# Patient Record
Sex: FEMALE | Race: WHITE | NOT HISPANIC OR LATINO | Employment: PART TIME | URBAN - METROPOLITAN AREA
[De-identification: names, ages, dates, MRNs, and addresses within clinical notes are randomized per-mention and may not be internally consistent; named-entity substitution may affect disease eponyms.]

---

## 2017-08-14 ENCOUNTER — HOSPITAL ENCOUNTER (OUTPATIENT)
Dept: OTHER | Facility: HOSPITAL | Age: 42
Discharge: HOME | End: 2017-08-14
Attending: FAMILY MEDICINE | Admitting: FAMILY MEDICINE

## 2017-08-28 ENCOUNTER — HOSPITAL ENCOUNTER (OUTPATIENT)
Dept: OTHER | Facility: HOSPITAL | Age: 42
Discharge: HOME | End: 2017-08-28
Attending: FAMILY MEDICINE | Admitting: FAMILY MEDICINE

## 2017-09-18 ENCOUNTER — HOSPITAL ENCOUNTER (OUTPATIENT)
Dept: MAMMOGRAPHY | Facility: HOSPITAL | Age: 42
Discharge: HOME | End: 2017-09-18
Attending: FAMILY MEDICINE | Admitting: FAMILY MEDICINE

## 2018-12-05 ENCOUNTER — HOSPITAL ENCOUNTER (OUTPATIENT)
Dept: OTHER | Facility: HOSPITAL | Age: 43
Discharge: HOME | End: 2018-12-05
Attending: FAMILY MEDICINE | Admitting: FAMILY MEDICINE

## 2019-07-08 ENCOUNTER — OFFICE VISIT (OUTPATIENT)
Dept: FAMILY MEDICINE CLINIC | Facility: CLINIC | Age: 44
End: 2019-07-08

## 2019-07-08 VITALS
SYSTOLIC BLOOD PRESSURE: 130 MMHG | TEMPERATURE: 98.6 F | BODY MASS INDEX: 21.97 KG/M2 | HEART RATE: 65 BPM | HEIGHT: 63 IN | DIASTOLIC BLOOD PRESSURE: 70 MMHG | WEIGHT: 124 LBS | RESPIRATION RATE: 18 BRPM | OXYGEN SATURATION: 97 %

## 2019-07-08 DIAGNOSIS — H60.11 CELLULITIS OF RIGHT EAR: Primary | ICD-10-CM

## 2019-07-08 PROCEDURE — 99213 OFFICE O/P EST LOW 20 MIN: CPT | Performed by: FAMILY MEDICINE

## 2019-07-08 RX ORDER — NAPROXEN 500 MG/1
500 TABLET ORAL DAILY
COMMUNITY
End: 2022-12-01

## 2019-07-08 RX ORDER — FLUOXETINE HYDROCHLORIDE 20 MG/1
20 CAPSULE ORAL DAILY
COMMUNITY
End: 2019-11-20 | Stop reason: SDUPTHER

## 2019-07-08 RX ORDER — SULFAMETHOXAZOLE AND TRIMETHOPRIM 800; 160 MG/1; MG/1
1 TABLET ORAL 2 TIMES DAILY
Qty: 30 TABLET | Refills: 0 | Status: SHIPPED | OUTPATIENT
Start: 2019-07-08 | End: 2020-01-20

## 2019-07-08 NOTE — PROGRESS NOTES
Subjective   Morena Mcfadden is a 43 y.o. female.     Chief Complaint   Patient presents with   • Earache       Earache    There is pain in the right ear. This is a new problem. The current episode started in the past 7 days. The problem occurs constantly. The problem has been rapidly worsening. There has been no fever. The pain is at a severity of 7/10. The pain is moderate. Pertinent negatives include no abdominal pain or ear discharge. Associated symptoms comments: Morena's right ear lobe is swollen, red and very tender to the touch. She reports that she poke a needle in her ear to try and drain it on 2019 with no relief.. She has tried nothing for the symptoms.            I personally reviewed and updated the patient's allergies, medications, problem list, and past medical, surgical, social, and family history.      Family History   Problem Relation Age of Onset   • Diabetes Father    • Alcohol abuse Father    • Cancer Paternal Aunt         Unknown Type       Social History     Tobacco Use   • Smoking status: Current Every Day Smoker     Packs/day: 1.00     Years: 25.00     Pack years: 25.00     Types: Cigarettes     Start date:    • Smokeless tobacco: Never Used   Substance Use Topics   • Alcohol use: Yes     Alcohol/week: 0.6 - 1.2 oz     Types: 1 - 2 Cans of beer per week     Frequency: 4 or more times a week     Drinks per session: 1 or 2     Binge frequency: Monthly   • Drug use: No       Past Surgical History:   Procedure Laterality Date   •  SECTION     •  SECTION     • RESECTION SMALL BOWEL / CLOSURE ILEOSTOMY  2013    ileal duodenal resection- mass benign   • TUBAL ABDOMINAL LIGATION  2012    [facility]       Patient Active Problem List   Diagnosis   • Cellulitis of right ear         Current Outpatient Medications:   •  FLUoxetine (PROzac) 20 MG capsule, Take 20 mg by mouth Daily., Disp: , Rfl:   •  Multiple Vitamins-Minerals (MULTIVITAMIN ADULT  "PO), Take 1 tablet by mouth Daily., Disp: , Rfl:   •  naproxen (NAPROSYN) 500 MG tablet, Take 500 mg by mouth Daily., Disp: , Rfl:   •  sulfamethoxazole-trimethoprim (BACTRIM DS) 800-160 MG per tablet, Take 1 tablet by mouth 2 (Two) Times a Day., Disp: 30 tablet, Rfl: 0         Review of Systems   Constitutional: Negative for chills and diaphoresis.   HENT: Positive for ear pain. Negative for ear discharge.    Eyes: Negative for visual disturbance.   Respiratory: Negative for shortness of breath.    Cardiovascular: Negative for chest pain and palpitations.   Gastrointestinal: Negative for abdominal pain and nausea.   Endocrine: Negative for polydipsia and polyphagia.   Musculoskeletal: Negative for neck stiffness.   Skin: Negative for color change and pallor.   Neurological: Negative for seizures and syncope.   Hematological: Negative for adenopathy.       Objective   /70 (BP Location: Right arm, Patient Position: Sitting, Cuff Size: Adult)   Pulse 65   Temp 98.6 °F (37 °C) (Oral)   Resp 18   Ht 160 cm (63\")   Wt 56.2 kg (124 lb)   LMP 06/10/2019 (Exact Date)   SpO2 97% Comment: Room air  Breastfeeding? No   BMI 21.97 kg/m²   Physical Exam   Constitutional: She is oriented to person, place, and time. She appears well-developed and well-nourished.   HENT:   Head: Normocephalic.   Right Ear: Hearing, external ear and ear canal normal. Tympanic membrane is erythematous. A middle ear effusion is present.   Left Ear: Hearing, external ear and ear canal normal. Tympanic membrane is erythematous. A middle ear effusion is present.   Nose: Congestion present. Right sinus exhibits maxillary sinus tenderness and frontal sinus tenderness. Left sinus exhibits maxillary sinus tenderness and frontal sinus tenderness.   Mouth/Throat: Posterior oropharyngeal erythema present. No tonsillar abscesses. Tonsils are 1+ on the right. Tonsils are 1+ on the left.   Cellulitis r pinna with swelling, no induration or fluctuance "    Eyes: Conjunctivae, EOM and lids are normal. Pupils are equal, round, and reactive to light.   Neck: No Brudzinski's sign and no Kernig's sign noted.   Cardiovascular: Normal rate, regular rhythm, S1 normal, S2 normal, normal heart sounds and normal pulses. Exam reveals no gallop and no friction rub.   No murmur heard.  Pulmonary/Chest: Effort normal. No accessory muscle usage or stridor. No respiratory distress. She has no decreased breath sounds. She has wheezes in the right upper field, the right middle field, the right lower field, the left upper field, the left middle field and the left lower field. She has rhonchi in the right upper field, the right middle field, the right lower field, the left upper field, the left middle field and the left lower field. She has no rales.   Abdominal: Soft. Normal appearance and bowel sounds are normal. She exhibits no distension and no mass. There is no tenderness. No hernia.   Neurological: She is alert and oriented to person, place, and time. No cranial nerve deficit. Coordination and gait normal.   Skin: Skin is warm and dry. Turgor is normal. She is not diaphoretic. No pallor.         Assessment/Plan   Problems Addressed this Visit        Nervous and Auditory    Cellulitis of right ear - Primary    Relevant Orders    Ambulatory Referral to ENT (Otolaryngology) (Completed)        Problem List Items Addressed This Visit        Nervous and Auditory    Cellulitis of right ear - Primary    Overview     Start antibiotics  Discuss possible necessity of incision and drainage.  heent ref sched  Call or return if worsening or persistent symptoms.          Relevant Orders    Ambulatory Referral to ENT (Otolaryngology) (Completed)            Expected course, medications, and adverse effects discussed.  Call or return if worsening or persistent symptoms.

## 2019-11-22 RX ORDER — FLUOXETINE HYDROCHLORIDE 20 MG/1
CAPSULE ORAL
Qty: 30 CAPSULE | Refills: 0 | Status: SHIPPED | OUTPATIENT
Start: 2019-11-22 | End: 2019-12-15 | Stop reason: SDUPTHER

## 2019-12-17 RX ORDER — FLUOXETINE HYDROCHLORIDE 20 MG/1
CAPSULE ORAL
Qty: 10 CAPSULE | Refills: 0 | Status: SHIPPED | OUTPATIENT
Start: 2019-12-17 | End: 2020-01-20 | Stop reason: SDUPTHER

## 2020-01-20 ENCOUNTER — OFFICE VISIT (OUTPATIENT)
Dept: FAMILY MEDICINE CLINIC | Facility: CLINIC | Age: 45
End: 2020-01-20

## 2020-01-20 VITALS
HEART RATE: 100 BPM | RESPIRATION RATE: 16 BRPM | WEIGHT: 129.4 LBS | TEMPERATURE: 98.6 F | DIASTOLIC BLOOD PRESSURE: 72 MMHG | OXYGEN SATURATION: 98 % | HEIGHT: 63 IN | BODY MASS INDEX: 22.93 KG/M2 | SYSTOLIC BLOOD PRESSURE: 112 MMHG

## 2020-01-20 DIAGNOSIS — F41.9 ANXIETY: ICD-10-CM

## 2020-01-20 DIAGNOSIS — F33.1 MODERATE EPISODE OF RECURRENT MAJOR DEPRESSIVE DISORDER (HCC): Primary | ICD-10-CM

## 2020-01-20 PROBLEM — R92.30 DENSE BREAST: Status: ACTIVE | Noted: 2020-01-20

## 2020-01-20 PROBLEM — F43.23 ACUTE ADJUSTMENT DISORDER WITH MIXED ANXIETY AND DEPRESSED MOOD: Status: ACTIVE | Noted: 2020-01-20

## 2020-01-20 PROBLEM — M25.50 ARTHRALGIA OF MULTIPLE SITES: Status: ACTIVE | Noted: 2020-01-20

## 2020-01-20 PROBLEM — R92.2 DENSE BREAST: Status: ACTIVE | Noted: 2020-01-20

## 2020-01-20 PROBLEM — E78.5 DYSLIPIDEMIA: Status: ACTIVE | Noted: 2020-01-20

## 2020-01-20 PROBLEM — F43.23 ACUTE ADJUSTMENT DISORDER WITH MIXED ANXIETY AND DEPRESSED MOOD: Status: RESOLVED | Noted: 2020-01-20 | Resolved: 2020-01-20

## 2020-01-20 PROCEDURE — 99213 OFFICE O/P EST LOW 20 MIN: CPT | Performed by: FAMILY MEDICINE

## 2020-01-20 RX ORDER — FLUOXETINE HYDROCHLORIDE 20 MG/1
20 CAPSULE ORAL DAILY
Qty: 30 CAPSULE | Refills: 12 | Status: SHIPPED | OUTPATIENT
Start: 2020-01-20 | End: 2021-03-18 | Stop reason: SDUPTHER

## 2020-01-20 NOTE — PROGRESS NOTES
Subjective   Morena Mcfadden is a 44 y.o. female.     Chief Complaint   Patient presents with   • Depression       Depression   Visit Type: follow-up  Patient presents with the following symptoms: depressed mood, excessive worry, fatigue and irritability.  Patient is not experiencing: decreased concentration, insomnia, nervousness/anxiety, restlessness and shortness of breath.  Frequency of symptoms: constantly   Severity: mild   Sleep per night: 7 hours  Sleep quality: fair  Nighttime awakenings: several       The following portions of the patient's history were reviewed and updated as appropriate: allergies, current medications, past family history, past medical history, past social history, past surgical history and problem list.    Allergies:  No Known Allergies    Social History:  Social History     Socioeconomic History   • Marital status:      Spouse name: Not on file   • Number of children: Not on file   • Years of education: Not on file   • Highest education level: Not on file   Tobacco Use   • Smoking status: Current Every Day Smoker     Packs/day: 1.00     Years: 25.00     Pack years: 25.00     Types: Cigarettes     Start date: 1993   • Smokeless tobacco: Never Used   Substance and Sexual Activity   • Alcohol use: Yes     Alcohol/week: 1.0 - 2.0 standard drinks     Types: 1 - 2 Cans of beer per week     Frequency: 4 or more times a week     Drinks per session: 1 or 2     Binge frequency: Monthly   • Drug use: No       Family History:  Family History   Problem Relation Age of Onset   • Diabetes Father    • Alcohol abuse Father    • Cancer Paternal Aunt         Unknown Type       Past Medical History :  Patient Active Problem List   Diagnosis   • Cellulitis of right ear   • Anxiety   • Arthralgia of multiple sites   • Dense breast   • Dyslipidemia   • Moderate episode of recurrent major depressive disorder (CMS/HCC)       Medication List:    Current Outpatient Medications:   •  FLUoxetine (PROzac)  "20 MG capsule, Take 1 capsule by mouth Daily., Disp: 30 capsule, Rfl: 12  •  Multiple Vitamins-Minerals (MULTIVITAMIN ADULT PO), Take 1 tablet by mouth Daily., Disp: , Rfl:   •  naproxen (NAPROSYN) 500 MG tablet, Take 500 mg by mouth Daily., Disp: , Rfl:     Past Surgical History:  Past Surgical History:   Procedure Laterality Date   •  SECTION     •  SECTION     • RESECTION SMALL BOWEL / CLOSURE ILEOSTOMY  2013    ileal duodenal resection- mass benign   • TUBAL ABDOMINAL LIGATION  2012    [facility]       Review of Systems:  Review of Systems   Constitutional: Positive for irritability. Negative for activity change and fever.   HENT: Negative for ear pain, rhinorrhea, sinus pressure and voice change.    Eyes: Negative for visual disturbance.   Respiratory: Negative for cough and shortness of breath.    Cardiovascular: Negative for chest pain.   Gastrointestinal: Negative for abdominal pain, diarrhea, nausea and vomiting.   Endocrine: Negative for cold intolerance and heat intolerance.   Genitourinary: Negative for frequency and urgency.   Musculoskeletal: Negative for arthralgias.   Skin: Negative for rash.   Neurological: Negative for syncope.   Hematological: Does not bruise/bleed easily.   Psychiatric/Behavioral: Positive for depressed mood. Negative for decreased concentration. The patient is not nervous/anxious and does not have insomnia.        I have reviewed and confirmed the accuracy of the ROS as documented by the MA/LPN/RN Maryellen Azar MD    Vital Signs:  Visit Vitals  /72   Pulse 100   Temp 98.6 °F (37 °C)   Resp 16   Ht 160.7 cm (63.25\")   Wt 58.7 kg (129 lb 6.4 oz)   LMP 12/15/2019 (Approximate)   SpO2 98%   BMI 22.74 kg/m²       Physical Exam   Constitutional: She is oriented to person, place, and time. She appears well-developed and well-nourished. She is cooperative.   Cardiovascular: Normal rate, regular rhythm and normal heart sounds. Exam " reveals no gallop and no friction rub.   No murmur heard.  Pulmonary/Chest: Effort normal and breath sounds normal. She has no wheezes. She has no rales.   Neurological: She is alert and oriented to person, place, and time. Coordination normal.   Skin: Skin is warm and dry.   Psychiatric: She has a normal mood and affect.   Vitals reviewed.      Assessment and Plan:  Problem List Items Addressed This Visit        Other    Anxiety    Relevant Medications    FLUoxetine (PROzac) 20 MG capsule    Moderate episode of recurrent major depressive disorder (CMS/HCC) - Primary    Overview     Refill prozac  Good social support, no suicidal or homicidal ideation. Diagnosis and treatment discussed. Discussed counseling. Discussed medication, dosing and adverse effects. Return in 4 weeks         Relevant Medications    FLUoxetine (PROzac) 20 MG capsule          An After Visit Summary and PPPS were given to the patient.

## 2020-02-10 ENCOUNTER — OFFICE VISIT (OUTPATIENT)
Dept: FAMILY MEDICINE CLINIC | Facility: CLINIC | Age: 45
End: 2020-02-10

## 2020-02-10 VITALS
WEIGHT: 129 LBS | SYSTOLIC BLOOD PRESSURE: 102 MMHG | TEMPERATURE: 98.4 F | OXYGEN SATURATION: 98 % | DIASTOLIC BLOOD PRESSURE: 66 MMHG | RESPIRATION RATE: 18 BRPM | HEIGHT: 64 IN | HEART RATE: 86 BPM | BODY MASS INDEX: 22.02 KG/M2

## 2020-02-10 DIAGNOSIS — Z12.31 SCREENING MAMMOGRAM, ENCOUNTER FOR: Chronic | ICD-10-CM

## 2020-02-10 DIAGNOSIS — Z12.4 SCREENING FOR CERVICAL CANCER: Chronic | ICD-10-CM

## 2020-02-10 DIAGNOSIS — Z11.3 SCREEN FOR STD (SEXUALLY TRANSMITTED DISEASE): ICD-10-CM

## 2020-02-10 DIAGNOSIS — F41.9 ANXIETY: ICD-10-CM

## 2020-02-10 DIAGNOSIS — F33.1 MODERATE EPISODE OF RECURRENT MAJOR DEPRESSIVE DISORDER (HCC): Primary | ICD-10-CM

## 2020-02-10 DIAGNOSIS — E78.5 DYSLIPIDEMIA: ICD-10-CM

## 2020-02-10 DIAGNOSIS — Z00.01 ENCOUNTER FOR GENERAL ADULT MEDICAL EXAMINATION WITH ABNORMAL FINDINGS: ICD-10-CM

## 2020-02-10 PROBLEM — H60.11 CELLULITIS OF RIGHT EAR: Status: RESOLVED | Noted: 2019-07-08 | Resolved: 2020-02-10

## 2020-02-10 LAB
BILIRUB BLD-MCNC: NEGATIVE MG/DL
CLARITY, POC: CLEAR
COLOR UR: YELLOW
GLUCOSE UR STRIP-MCNC: NEGATIVE MG/DL
KETONES UR QL: NEGATIVE
LEUKOCYTE EST, POC: NEGATIVE
NITRITE UR-MCNC: NEGATIVE MG/ML
PH UR: 5 [PH] (ref 5–8)
PROT UR STRIP-MCNC: ABNORMAL MG/DL
RBC # UR STRIP: NEGATIVE /UL
SP GR UR: 1.01 (ref 1–1.03)
UROBILINOGEN UR QL: NORMAL

## 2020-02-10 PROCEDURE — 99212 OFFICE O/P EST SF 10 MIN: CPT | Performed by: FAMILY MEDICINE

## 2020-02-10 PROCEDURE — 99396 PREV VISIT EST AGE 40-64: CPT | Performed by: FAMILY MEDICINE

## 2020-02-10 PROCEDURE — 81003 URINALYSIS AUTO W/O SCOPE: CPT | Performed by: FAMILY MEDICINE

## 2020-02-10 NOTE — PROGRESS NOTES
"  Chief Complaint   Patient presents with   • Annual Exam   • Exposure to STD         Subjective   Morena Mcfadden is a 44 y.o. female here for a Well Woman Visit. Energy level is described as good and she is sleeping well. She sleeps 6 hours nightly. Last pap was 2017. Contraception: none. Patient exercises not at all. Nutrition is described as in general, a \"healthy\" diet  . Her   libido is normal. She reports that she does not perform monthly self breast exam.      Exposure to STD    This is a new problem. The current episode started today. The problem has been unchanged. Pertinent negatives include no abdominal pain, fever or urinary frequency. She has tried nothing for the symptoms.   Depression   Visit Type: follow-up  Patient is not experiencing: decreased concentration, depressed mood, excessive worry, feelings of hopelessness, feelings of worthlessness, insomnia, irritability, nervousness/anxiety, palpitations, shortness of breath, suicidal ideas, suicidal planning and thoughts of death.  Frequency of symptoms: occasionally   Severity: moderate   Sleep quality: good      Anxiety   Presents for follow-up visit. Patient reports no chest pain, decreased concentration, depressed mood, excessive worry, insomnia, irritability, nausea, nervous/anxious behavior, palpitations, shortness of breath or suicidal ideas. Symptoms occur occasionally. The severity of symptoms is moderate. The quality of sleep is good.     Her past medical history is significant for depression. Compliance with medications is %.    Prozac is helping for her anxiety and depression    I personally reviewed and updated the patient's allergies, medications, problem list, and past medical, surgical, social, and family history.     Allergies:  No Known Allergies    Social History:  Social History     Socioeconomic History   • Marital status:      Spouse name: Not on file   • Number of children: Not on file   • Years of education: Not " on file   • Highest education level: Not on file   Tobacco Use   • Smoking status: Current Every Day Smoker     Packs/day: 1.00     Years: 25.00     Pack years: 25.00     Types: Cigarettes     Start date:    • Smokeless tobacco: Never Used   Substance and Sexual Activity   • Alcohol use: Yes     Alcohol/week: 1.0 - 2.0 standard drinks     Types: 1 - 2 Cans of beer per week     Frequency: 4 or more times a week     Drinks per session: 1 or 2     Binge frequency: Monthly   • Drug use: No       Family History:  Family History   Problem Relation Age of Onset   • Diabetes Father    • Alcohol abuse Father    • Cancer Paternal Aunt         Unknown Type       Past Medical History :  Active Ambulatory Problems     Diagnosis Date Noted   • Anxiety 2020   • Arthralgia of multiple sites 2020   • Dense breast 2020   • Dyslipidemia 2020   • Moderate episode of recurrent major depressive disorder (CMS/HCC) 2020     Resolved Ambulatory Problems     Diagnosis Date Noted   • Cellulitis of right ear 2019   • Acute adjustment disorder with mixed anxiety and depressed mood 2020     Past Medical History:   Diagnosis Date   • Adjustment disorder with mixed anxiety and depressed mood    • Anxiety disorder        Medication List:    Current Outpatient Medications:   •  FLUoxetine (PROzac) 20 MG capsule, Take 1 capsule by mouth Daily., Disp: 30 capsule, Rfl: 12  •  Multiple Vitamins-Minerals (MULTIVITAMIN ADULT PO), Take 1 tablet by mouth Daily., Disp: , Rfl:   •  naproxen (NAPROSYN) 500 MG tablet, Take 500 mg by mouth Daily., Disp: , Rfl:     Past Surgical History:  Past Surgical History:   Procedure Laterality Date   •  SECTION     •  SECTION     • RESECTION SMALL BOWEL / CLOSURE ILEOSTOMY  2013    ileal duodenal resection- mass benign   • TUBAL ABDOMINAL LIGATION  2012    [facility]       Depression Screen:   PHQ-2/PHQ-9 Depression Screening 2019  "  Little interest or pleasure in doing things 0   Feeling down, depressed, or hopeless 0   Total Score 0       Fall Risk Screen:  SRAVANI Fall Risk Assessment has not been completed.    Review Of Systems:  Review of Systems   Constitutional: Negative for activity change, fever and irritability.   HENT: Negative for ear pain, rhinorrhea, sinus pressure and voice change.    Eyes: Negative for visual disturbance.   Respiratory: Negative for cough and shortness of breath.    Cardiovascular: Negative for chest pain and palpitations.   Gastrointestinal: Negative for abdominal pain, diarrhea, nausea and vomiting.   Endocrine: Negative for cold intolerance and heat intolerance.   Genitourinary: Negative for frequency and urgency.   Musculoskeletal: Negative for arthralgias.   Skin: Negative for rash.   Neurological: Negative for syncope.   Hematological: Does not bruise/bleed easily.   Psychiatric/Behavioral: Negative for decreased concentration, suicidal ideas and depressed mood. The patient is not nervous/anxious and does not have insomnia.        Physical Exam:  Vital Signs:  Visit Vitals  /66   Pulse 86   Temp 98.4 °F (36.9 °C)   Resp 18   Ht 161.3 cm (63.5\")   Wt 58.5 kg (129 lb)   LMP 01/27/2020 (Approximate)   SpO2 98%   BMI 22.49 kg/m²       Physical Exam   Constitutional: She is oriented to person, place, and time. She appears well-developed and well-nourished. No distress.   HENT:   Head: Normocephalic and atraumatic.   Right Ear: External ear normal.   Left Ear: External ear normal.   Nose: Nose normal.   Mouth/Throat: Oropharynx is clear and moist. No oropharyngeal exudate.   Eyes: Pupils are equal, round, and reactive to light. Conjunctivae and EOM are normal. Right eye exhibits no discharge. Left eye exhibits no discharge. No scleral icterus.   Neck: Normal range of motion. Neck supple. No tracheal deviation present. No thyromegaly present.   Cardiovascular: Normal rate, regular rhythm, normal heart " sounds and intact distal pulses. Exam reveals no gallop and no friction rub.   No murmur heard.  Pulmonary/Chest: Effort normal and breath sounds normal. No stridor. No respiratory distress. She has no wheezes. She has no rales. Right breast exhibits no inverted nipple, no mass, no nipple discharge, no skin change and no tenderness. Left breast exhibits no inverted nipple, no mass, no nipple discharge, no skin change and no tenderness.   Abdominal: Soft. Bowel sounds are normal. She exhibits no distension and no mass. There is no tenderness. There is no rebound and no guarding.   Genitourinary: Uterus normal. There is no rash, tenderness or lesion on the right labia. There is no rash, tenderness or lesion on the left labia. Cervix does not exhibit discharge or lesion. Right adnexum displays no mass and no tenderness. Left adnexum displays no mass and no tenderness. Vagina exhibits no lesion. No erythema or bleeding in the vagina. No vaginal discharge found.   Musculoskeletal: Normal range of motion. She exhibits no edema, tenderness or deformity.   Lymphadenopathy:     She has no cervical adenopathy.   Neurological: She is alert and oriented to person, place, and time. She has normal strength and normal reflexes. She displays no atrophy, no tremor and normal reflexes. No cranial nerve deficit or sensory deficit. She exhibits normal muscle tone. Coordination and gait normal.   Skin: Skin is warm and dry. Capillary refill takes less than 2 seconds. No rash noted. She is not diaphoretic. No erythema. No pallor.   Psychiatric: She has a normal mood and affect. Her behavior is normal. Judgment and thought content normal. Cognition and memory are normal. Cognition and memory are not impaired. She exhibits normal recent memory and normal remote memory.   Nursing note and vitals reviewed.        Assessment and Plan:  Problem List Items Addressed This Visit        Other    Anxiety    Current Assessment & Plan     Prozac is  helping         Dyslipidemia    Overview     check labs         Relevant Orders    Comprehensive Metabolic Panel (Completed)    Lipid Panel With / Chol / HDL Ratio (Completed)    Moderate episode of recurrent major depressive disorder (CMS/HCC) - Primary    Overview     prozac is helping, will continue  Good social support, no suicidal or homicidal ideation. Diagnosis and treatment discussed. Discussed counseling. Discussed medication, dosing and adverse effects.            Other Visit Diagnoses     Encounter for general adult medical examination with abnormal findings        Chart reviewed and updated    Relevant Orders    POC Urinalysis Dipstick, Automated (Completed)    CBC & Differential (Completed)    TSH (Completed)    Screen for STD (sexually transmitted disease)        New problem: Will get STD test and pap smear secondary to partner had cheated on her a few times. No cervical tenderness on exam, so no zpak or rocephin today    Relevant Orders    Hepatitis Panel, Acute (Completed)    Screening mammogram, encounter for  (Chronic)       Last mammo 12/05/2018.    Relevant Orders    Mammo Screening Digital Tomosynthesis Bilateral With CAD    Screening for cervical cancer  (Chronic)       last pap with HPV WNL 8/14/2017.    Relevant Orders    HPV, Low Volume Reflex    Liquid-based Pap Smear, Screening    Chlamydia trachomatis, Neisseria gonorrhoeae, PCR - ThinPrep Vial, Cervix (Completed)    Hepatitis Panel, Acute    HIV 1 RNA Qualitative (Completed)          An After Visit Summary and PPPS were given to the patient.       Discussed wearing sunscreen, seatbelts. Avoidance or caution with alcohol. Safe sex practices discussed. Discussed screening as appropriate for age.

## 2020-02-13 LAB
C TRACH RRNA SPEC QL NAA+PROBE: NEGATIVE
CYTOLOGIST CVX/VAG CYTO: NORMAL
CYTOLOGY CVX/VAG DOC CYTO: NORMAL
DX ICD CODE: NORMAL
HIV 1 & 2 AB SER-IMP: NORMAL
HPV I/H RISK 1 DNA CVX QL PROBE+SIG AMP: NEGATIVE
N GONORRHOEA RRNA SPEC QL NAA+PROBE: NEGATIVE
OTHER STN SPEC: NORMAL
STAT OF ADQ CVX/VAG CYTO-IMP: NORMAL

## 2020-02-14 LAB
ALBUMIN SERPL-MCNC: 4.3 G/DL (ref 3.8–4.8)
ALBUMIN/GLOB SERPL: 2 {RATIO} (ref 1.2–2.2)
ALP SERPL-CCNC: 73 IU/L (ref 39–117)
ALT SERPL-CCNC: 12 IU/L (ref 0–32)
AST SERPL-CCNC: 21 IU/L (ref 0–40)
BASOPHILS # BLD AUTO: 0.1 X10E3/UL (ref 0–0.2)
BASOPHILS NFR BLD AUTO: 1 %
BILIRUB SERPL-MCNC: 0.6 MG/DL (ref 0–1.2)
BUN SERPL-MCNC: 8 MG/DL (ref 6–24)
BUN/CREAT SERPL: 11 (ref 9–23)
CALCIUM SERPL-MCNC: 9.7 MG/DL (ref 8.7–10.2)
CHLORIDE SERPL-SCNC: 103 MMOL/L (ref 96–106)
CHOLEST SERPL-MCNC: 160 MG/DL (ref 100–199)
CHOLEST/HDLC SERPL: 2.1 RATIO (ref 0–4.4)
CO2 SERPL-SCNC: 26 MMOL/L (ref 20–29)
CREAT SERPL-MCNC: 0.7 MG/DL (ref 0.57–1)
EOSINOPHIL # BLD AUTO: 0.3 X10E3/UL (ref 0–0.4)
EOSINOPHIL NFR BLD AUTO: 4 %
ERYTHROCYTE [DISTWIDTH] IN BLOOD BY AUTOMATED COUNT: 12.8 % (ref 11.7–15.4)
GLOBULIN SER CALC-MCNC: 2.2 G/DL (ref 1.5–4.5)
GLUCOSE SERPL-MCNC: 88 MG/DL (ref 65–99)
HAV IGM SERPL QL IA: NEGATIVE
HBV CORE IGM SERPL QL IA: NEGATIVE
HBV SURFACE AG SERPL QL IA: NEGATIVE
HCT VFR BLD AUTO: 42.8 % (ref 34–46.6)
HCV AB S/CO SERPL IA: 0.2 S/CO RATIO (ref 0–0.9)
HDLC SERPL-MCNC: 76 MG/DL
HGB BLD-MCNC: 13.5 G/DL (ref 11.1–15.9)
HIV1 RNA SERPL QL NAA+PROBE: NEGATIVE
IMM GRANULOCYTES # BLD AUTO: 0 X10E3/UL (ref 0–0.1)
IMM GRANULOCYTES NFR BLD AUTO: 0 %
LDLC SERPL CALC-MCNC: 73 MG/DL (ref 0–99)
LYMPHOCYTES # BLD AUTO: 2.7 X10E3/UL (ref 0.7–3.1)
LYMPHOCYTES NFR BLD AUTO: 34 %
MCH RBC QN AUTO: 30.1 PG (ref 26.6–33)
MCHC RBC AUTO-ENTMCNC: 31.5 G/DL (ref 31.5–35.7)
MCV RBC AUTO: 96 FL (ref 79–97)
MONOCYTES # BLD AUTO: 0.6 X10E3/UL (ref 0.1–0.9)
MONOCYTES NFR BLD AUTO: 7 %
NEUTROPHILS # BLD AUTO: 4.3 X10E3/UL (ref 1.4–7)
NEUTROPHILS NFR BLD AUTO: 54 %
PLATELET # BLD AUTO: 339 X10E3/UL (ref 150–450)
POTASSIUM SERPL-SCNC: 4.5 MMOL/L (ref 3.5–5.2)
PROT SERPL-MCNC: 6.5 G/DL (ref 6–8.5)
RBC # BLD AUTO: 4.48 X10E6/UL (ref 3.77–5.28)
SODIUM SERPL-SCNC: 140 MMOL/L (ref 134–144)
TRIGL SERPL-MCNC: 54 MG/DL (ref 0–149)
TSH SERPL DL<=0.005 MIU/L-ACNC: 1.18 UIU/ML (ref 0.45–4.5)
VLDLC SERPL CALC-MCNC: 11 MG/DL (ref 5–40)
WBC # BLD AUTO: 7.9 X10E3/UL (ref 3.4–10.8)

## 2020-02-18 ENCOUNTER — TELEPHONE (OUTPATIENT)
Dept: FAMILY MEDICINE CLINIC | Facility: CLINIC | Age: 45
End: 2020-02-18

## 2020-02-18 NOTE — TELEPHONE ENCOUNTER
----- Message from Maryellen Azar MD sent at 2/14/2020  4:58 PM EST -----  Labs and pap came back negative

## 2020-03-09 ENCOUNTER — HOSPITAL ENCOUNTER (OUTPATIENT)
Dept: MAMMOGRAPHY | Facility: HOSPITAL | Age: 45
Discharge: HOME | End: 2020-03-09
Admitting: FAMILY MEDICINE

## 2020-03-09 DIAGNOSIS — Z12.31 SCREENING MAMMOGRAM, ENCOUNTER FOR: Chronic | ICD-10-CM

## 2020-03-09 PROCEDURE — 77063 BREAST TOMOSYNTHESIS BI: CPT

## 2020-03-09 PROCEDURE — 77067 SCR MAMMO BI INCL CAD: CPT

## 2020-05-01 ENCOUNTER — TELEPHONE (OUTPATIENT)
Dept: FAMILY MEDICINE CLINIC | Facility: CLINIC | Age: 45
End: 2020-05-01

## 2020-05-19 ENCOUNTER — OFFICE VISIT (OUTPATIENT)
Dept: FAMILY MEDICINE CLINIC | Facility: CLINIC | Age: 45
End: 2020-05-19

## 2020-05-19 ENCOUNTER — HOSPITAL ENCOUNTER (OUTPATIENT)
Dept: GENERAL RADIOLOGY | Facility: HOSPITAL | Age: 45
Discharge: HOME | End: 2020-05-19
Admitting: FAMILY MEDICINE

## 2020-05-19 VITALS
RESPIRATION RATE: 16 BRPM | BODY MASS INDEX: 21.89 KG/M2 | SYSTOLIC BLOOD PRESSURE: 110 MMHG | HEART RATE: 84 BPM | OXYGEN SATURATION: 96 % | HEIGHT: 64 IN | TEMPERATURE: 98.3 F | DIASTOLIC BLOOD PRESSURE: 68 MMHG | WEIGHT: 128.2 LBS

## 2020-05-19 DIAGNOSIS — M25.532 LEFT WRIST PAIN: Primary | ICD-10-CM

## 2020-05-19 PROCEDURE — 99213 OFFICE O/P EST LOW 20 MIN: CPT | Performed by: FAMILY MEDICINE

## 2020-05-19 PROCEDURE — 73110 X-RAY EXAM OF WRIST: CPT

## 2020-05-19 NOTE — PROGRESS NOTES
Subjective   Morena Mcfadden is a 44 y.o. female.     Chief Complaint   Patient presents with   • Arm Pain       Arm Pain    The incident occurred more than 1 week ago. There was no injury mechanism. The pain is present in the left elbow, left forearm, left fingers, left hand and left wrist. The quality of the pain is described as aching and burning. The pain radiates to the left hand and left arm. The pain is at a severity of 4/10 (pain increases with laying down and becomes greater). The pain is moderate. The pain has been fluctuating since the incident. Associated symptoms include numbness and tingling. Pertinent negatives include no chest pain. The symptoms are aggravated by movement, lifting and palpation. She has tried nothing for the symptoms. The treatment provided no relief.        The following portions of the patient's history were reviewed and updated as appropriate: allergies, current medications, past family history, past medical history, past social history, past surgical history and problem list.    Allergies:  No Known Allergies    Social History:  Social History     Socioeconomic History   • Marital status:      Spouse name: Not on file   • Number of children: Not on file   • Years of education: Not on file   • Highest education level: Not on file   Tobacco Use   • Smoking status: Current Every Day Smoker     Packs/day: 1.00     Years: 25.00     Pack years: 25.00     Types: Cigarettes     Start date: 1993   • Smokeless tobacco: Never Used   Substance and Sexual Activity   • Alcohol use: Yes     Alcohol/week: 1.0 - 2.0 standard drinks     Types: 1 - 2 Cans of beer per week     Frequency: 4 or more times a week     Drinks per session: 1 or 2     Binge frequency: Monthly   • Drug use: No       Family History:  Family History   Problem Relation Age of Onset   • Diabetes Father    • Alcohol abuse Father    • Cancer Paternal Aunt         Unknown Type       Past Medical History :  Patient Active  "Problem List   Diagnosis   • Anxiety   • Arthralgia of multiple sites   • Dense breast   • Dyslipidemia   • Moderate episode of recurrent major depressive disorder (CMS/HCC)   • Left wrist pain       Medication List:    Current Outpatient Medications:   •  FLUoxetine (PROzac) 20 MG capsule, Take 1 capsule by mouth Daily., Disp: 30 capsule, Rfl: 12  •  Multiple Vitamins-Minerals (MULTIVITAMIN ADULT PO), Take 1 tablet by mouth Daily., Disp: , Rfl:   •  naproxen (NAPROSYN) 500 MG tablet, Take 500 mg by mouth Daily., Disp: , Rfl:     Past Surgical History:  Past Surgical History:   Procedure Laterality Date   •  SECTION     •  SECTION     • RESECTION SMALL BOWEL / CLOSURE ILEOSTOMY  2013    ileal duodenal resection- mass benign   • TUBAL ABDOMINAL LIGATION  2012    [facility]       Review of Systems:  Review of Systems   Constitutional: Negative for activity change and fever.   HENT: Negative for ear pain, rhinorrhea, sinus pressure and voice change.    Eyes: Negative for visual disturbance.   Respiratory: Negative for cough and shortness of breath.    Cardiovascular: Negative for chest pain.   Gastrointestinal: Negative for abdominal pain, diarrhea, nausea and vomiting.   Endocrine: Negative for cold intolerance and heat intolerance.   Genitourinary: Negative for frequency and urgency.   Musculoskeletal: Negative for arthralgias.   Skin: Negative for rash.   Neurological: Positive for tingling and numbness. Negative for syncope.   Hematological: Does not bruise/bleed easily.   Psychiatric/Behavioral: Negative for depressed mood. The patient is not nervous/anxious.        Physical Exam:  Vital Signs:  Visit Vitals  /68 (BP Location: Right arm)   Pulse 84   Temp 98.3 °F (36.8 °C) (Oral)   Resp 16   Ht 161.3 cm (63.5\")   Wt 58.2 kg (128 lb 3.2 oz)   LMP 2020 (Exact Date)   SpO2 96%   BMI 22.35 kg/m²       Physical Exam   Constitutional: She is oriented to person, place, " and time. She appears well-developed and well-nourished. She is cooperative.   Cardiovascular: Normal rate, regular rhythm and normal heart sounds. Exam reveals no gallop and no friction rub.   No murmur heard.  Pulmonary/Chest: Effort normal and breath sounds normal. She has no wheezes. She has no rales.   Musculoskeletal:        Left wrist: She exhibits tenderness.   Tender with extension and flexion. Negative tinel, positive phalen.    Neurological: She is alert and oriented to person, place, and time. Coordination normal.   Skin: Skin is warm and dry.   Psychiatric: She has a normal mood and affect.   Vitals reviewed.      Assessment and Plan:  Problem List Items Addressed This Visit        Nervous and Auditory    Left wrist pain - Primary    Relevant Orders    XR Wrist 3+ View Left (Completed)    EMG & Nerve Conduction Test       Will get emg and xray. Possibly carpal tunnel syndrome    An After Visit Summary and PPPS were given to the patient.             I wore protective equipment throughout this patient encounter to include mask, gloves and eye protection. Hand hygiene was performed before donning protective equipment and after removal when leaving the room.

## 2020-05-21 DIAGNOSIS — M25.532 LEFT WRIST PAIN: Primary | ICD-10-CM

## 2020-05-26 ENCOUNTER — TELEPHONE (OUTPATIENT)
Dept: FAMILY MEDICINE CLINIC | Facility: CLINIC | Age: 45
End: 2020-05-26

## 2020-06-11 ENCOUNTER — TELEPHONE (OUTPATIENT)
Dept: FAMILY MEDICINE CLINIC | Facility: CLINIC | Age: 45
End: 2020-06-11

## 2020-06-23 ENCOUNTER — TELEPHONE (OUTPATIENT)
Dept: FAMILY MEDICINE CLINIC | Facility: CLINIC | Age: 45
End: 2020-06-23

## 2020-07-13 ENCOUNTER — APPOINTMENT (OUTPATIENT)
Dept: INFUSION THERAPY | Facility: HOSPITAL | Age: 45
End: 2020-07-13

## 2020-09-11 ENCOUNTER — HOSPITAL ENCOUNTER (OUTPATIENT)
Dept: INFUSION THERAPY | Facility: HOSPITAL | Age: 45
Discharge: HOME | End: 2020-09-11
Admitting: PSYCHIATRY & NEUROLOGY

## 2020-09-11 DIAGNOSIS — M25.532 LEFT WRIST PAIN: ICD-10-CM

## 2020-09-11 PROCEDURE — 95911 NRV CNDJ TEST 9-10 STUDIES: CPT | Performed by: PSYCHIATRY & NEUROLOGY

## 2020-09-11 PROCEDURE — 95886 MUSC TEST DONE W/N TEST COMP: CPT | Performed by: PSYCHIATRY & NEUROLOGY

## 2020-09-11 PROCEDURE — 95911 NRV CNDJ TEST 9-10 STUDIES: CPT

## 2020-09-11 NOTE — PROCEDURES
EMG and Nerve Conduction Studies    I.      Instrument used: Neuromax 1002  II.     Please see data sheets for tabular summary of NCS and details on methods, temperatures and lab standards.   III.    EMG muscles tested for upper extremity studies include the deltoid, biceps, triceps, pronator teres, extensor digitorum communis, first dorsal interosseous and abductor pollicis brevis.    IV.   EMG muscles tested for lower extremity studies include the vastus lateralis, tibialis anterior, peroneus longus, medial gastrocnemius and extensor digitorum brevis.    V.    Additional muscles tested as needed.  Paraspinal muscles tested as needed.   VI.   Please see data sheets for tabular summary of EMG findings.   VII. The complete report includes the data sheets.      Indication: Bilateral carpal tunnel syndrome  History: 45-year-old woman with left wrist and hand pain with numbness and tingling in the left hand.  She has similar symptoms on the right but not as severe.  The patient has worked as a  for many years.  She has been using wrist splints with some degree of improvement in her nocturnal awakenings with wrist/hand pain numbness and tingling.      Ht: 161.3 cm  Wt: Not reported  HbA1C: No results found for: HGBA1C  TSH:   Lab Results   Component Value Date    TSH 1.180 02/10/2020       Technical summary:  Nerve conduction studies were obtained in both upper extremities.  Skin temperatures were at least 32 °C measured on the palms after warming the hands.  Needle examination was obtained on selected muscles in both upper extremities.    Results:  1.  Severely prolonged median sensory latencies at 5.9 ms on the left and 6.2 ms on the right with low amplitudes of 6.7 µV on the left and 8.7 µV on the right.  2.  Normal ulnar sensory studies bilaterally.  3.  Normal left radial sensory study.  4.  Severely prolonged left median motor latency at 7.1 ms with normal conduction velocity.  The amplitude was mildly low  at 4.0 mV from wrist stimulation.  Moderately prolonged right median motor latency at 5.5 ms with normal conduction velocity and amplitudes.  5.  Normal ulnar motor studies bilaterally.  6.  Needle examination of selected muscles in both arms showed occasional positive sharp waves in the abductor pollicis brevis muscles bilaterally.  There was a mild increased number of large motor units with increased firing rates and reduced interference patterns bilaterally.  All other muscles tested in both arm showed normal insertional activities, motor units and recruitment patterns.  Cervical paraspinals showed no abnormality at C7 on either side.    Impression:  Abnormal study showing bilateral median neuropathies at the wrists, moderate on the right and severe on the left.  There was no evidence of an ulnar neuropathy or cervical radiculopathy on either side by this study.  Needle exam changes were consistent with the nerve conduction findings.  Study results were discussed with the patient.    Robert Gregory M.D.              Dictated utilizing Dragon dictation.

## 2020-10-13 ENCOUNTER — TELEPHONE (OUTPATIENT)
Dept: FAMILY MEDICINE CLINIC | Facility: CLINIC | Age: 45
End: 2020-10-13

## 2020-10-13 DIAGNOSIS — M25.532 LEFT WRIST PAIN: Primary | ICD-10-CM

## 2020-10-13 NOTE — TELEPHONE ENCOUNTER
Called and spoke with pt and give appointment information on November 2 , 2020 at 9:15 am to arrive at 9:00 am. Pt stated she understood.

## 2021-03-18 ENCOUNTER — TELEPHONE (OUTPATIENT)
Dept: FAMILY MEDICINE CLINIC | Facility: CLINIC | Age: 46
End: 2021-03-18

## 2021-03-18 DIAGNOSIS — F41.9 ANXIETY: ICD-10-CM

## 2021-03-18 RX ORDER — FLUOXETINE HYDROCHLORIDE 20 MG/1
20 CAPSULE ORAL DAILY
Qty: 30 CAPSULE | Refills: 0 | Status: CANCELLED | OUTPATIENT
Start: 2021-03-18

## 2021-03-18 RX ORDER — FLUOXETINE HYDROCHLORIDE 20 MG/1
20 CAPSULE ORAL DAILY
Qty: 30 CAPSULE | Refills: 1 | Status: SHIPPED | OUTPATIENT
Start: 2021-03-18 | End: 2021-04-08 | Stop reason: SDUPTHER

## 2021-03-18 NOTE — TELEPHONE ENCOUNTER
Caller: Morena Mcfadden    Relationship: Self    Best call back number: [phone]   Medication needed:   Requested Prescriptions     Pending Prescriptions Disp Refills   • FLUoxetine (PROzac) 20 MG capsule 30 capsule 12     Sig: Take 1 capsule by mouth Daily.       When do you need the refill by: ASAP     What additional details did the patient provide when requesting the medication: ASAP     Does the patient have less than a 3 day supply:  [x] Yes  [] No    What is the patient's preferred pharmacy: [id number] - [place], [place] [address] - [phone] [facility] [phone] FX

## 2021-04-08 ENCOUNTER — OFFICE VISIT (OUTPATIENT)
Dept: FAMILY MEDICINE CLINIC | Facility: CLINIC | Age: 46
End: 2021-04-08

## 2021-04-08 VITALS
SYSTOLIC BLOOD PRESSURE: 120 MMHG | HEIGHT: 64 IN | TEMPERATURE: 98 F | RESPIRATION RATE: 18 BRPM | BODY MASS INDEX: 22.71 KG/M2 | HEART RATE: 86 BPM | WEIGHT: 133 LBS | OXYGEN SATURATION: 98 % | DIASTOLIC BLOOD PRESSURE: 64 MMHG

## 2021-04-08 DIAGNOSIS — F41.9 ANXIETY: ICD-10-CM

## 2021-04-08 DIAGNOSIS — F33.1 MODERATE EPISODE OF RECURRENT MAJOR DEPRESSIVE DISORDER (HCC): ICD-10-CM

## 2021-04-08 DIAGNOSIS — Z00.01 ENCOUNTER FOR ROUTINE ADULT PHYSICAL EXAM WITH ABNORMAL FINDINGS: Primary | ICD-10-CM

## 2021-04-08 DIAGNOSIS — E01.0 THYROMEGALY: ICD-10-CM

## 2021-04-08 DIAGNOSIS — Z12.31 SCREENING MAMMOGRAM, ENCOUNTER FOR: ICD-10-CM

## 2021-04-08 DIAGNOSIS — E78.5 DYSLIPIDEMIA: ICD-10-CM

## 2021-04-08 PROBLEM — Z12.4 SCREENING FOR CERVICAL CANCER: Status: ACTIVE | Noted: 2021-04-08

## 2021-04-08 LAB
BILIRUB BLD-MCNC: NEGATIVE MG/DL
CLARITY, POC: CLEAR
COLOR UR: YELLOW
GLUCOSE UR STRIP-MCNC: NEGATIVE MG/DL
KETONES UR QL: NEGATIVE
LEUKOCYTE EST, POC: NEGATIVE
NITRITE UR-MCNC: NEGATIVE MG/ML
PH UR: 5 [PH] (ref 5–8)
PROT UR STRIP-MCNC: ABNORMAL MG/DL
RBC # UR STRIP: NEGATIVE /UL
SP GR UR: 1.03 (ref 1–1.03)
UROBILINOGEN UR QL: NORMAL

## 2021-04-08 PROCEDURE — 99213 OFFICE O/P EST LOW 20 MIN: CPT | Performed by: FAMILY MEDICINE

## 2021-04-08 PROCEDURE — 81003 URINALYSIS AUTO W/O SCOPE: CPT | Performed by: FAMILY MEDICINE

## 2021-04-08 PROCEDURE — 99396 PREV VISIT EST AGE 40-64: CPT | Performed by: FAMILY MEDICINE

## 2021-04-08 RX ORDER — FLUOXETINE HYDROCHLORIDE 20 MG/1
20 CAPSULE ORAL DAILY
Qty: 30 CAPSULE | Refills: 12 | Status: SHIPPED | OUTPATIENT
Start: 2021-04-08 | End: 2022-07-07

## 2021-04-08 NOTE — PROGRESS NOTES
"  Chief Complaint   Patient presents with   • Annual Exam   • Hyperlipidemia         Subjective   Morena Mcfadden is a 45 y.o. female here for a Well Woman Visit. Energy level is described as good and she is sleeping fairly well. She sleeps 6 hours nightly. Last pap was negative with negative HPV on 2/10/2020. Contraception: none. Patient exercises irregularly. Nutrition is described as in general, a \"healthy\" diet  . Her   libido is abnormal. She reports that she does perform monthly self breast exam.      Hyperlipidemia  This is a chronic problem. The current episode started more than 1 year ago. The problem is uncontrolled. She has no history of diabetes, hypothyroidism, liver disease or obesity. There are no known factors aggravating her hyperlipidemia. Pertinent negatives include no chest pain, leg pain or shortness of breath. Current antihyperlipidemic treatment includes diet change and exercise. The current treatment provides moderate improvement of lipids. There are no compliance problems.  Risk factors for coronary artery disease include dyslipidemia.        I personally reviewed and updated the patient's allergies, medications, problem list, and past medical, surgical, social, and family history.     Allergies:  No Known Allergies    Social History:  Social History     Socioeconomic History   • Marital status:      Spouse name: Not on file   • Number of children: Not on file   • Years of education: Not on file   • Highest education level: Not on file   Tobacco Use   • Smoking status: Current Every Day Smoker     Packs/day: 1.00     Years: 28.00     Pack years: 28.00     Types: Cigarettes     Start date: 1993   • Smokeless tobacco: Never Used   Substance and Sexual Activity   • Alcohol use: Yes     Alcohol/week: 1.0 - 2.0 standard drinks     Types: 1 - 2 Cans of beer per week   • Drug use: No       Family History:  Family History   Problem Relation Age of Onset   • Diabetes Father    • Alcohol abuse " Father    • Cancer Paternal Aunt         Unknown Type   • Heart disease Mother         heart problems       Past Medical History :  Active Ambulatory Problems     Diagnosis Date Noted   • Anxiety 2020   • Arthralgia of multiple sites 2020   • Dense breast 2020   • Dyslipidemia 2020   • Moderate episode of recurrent major depressive disorder (CMS/HCC) 2020   • Left wrist pain 2020   • Screening mammogram, encounter for 2021   • Screening for cervical cancer 2021   • Thyromegaly 2021     Resolved Ambulatory Problems     Diagnosis Date Noted   • Cellulitis of right ear 2019   • Acute adjustment disorder with mixed anxiety and depressed mood 2020     Past Medical History:   Diagnosis Date   • Adjustment disorder with mixed anxiety and depressed mood    • Anxiety disorder        Medication List:    Current Outpatient Medications:   •  FLUoxetine (PROzac) 20 MG capsule, Take 1 capsule by mouth Daily., Disp: 30 capsule, Rfl: 12  •  naproxen (NAPROSYN) 500 MG tablet, Take 500 mg by mouth Daily., Disp: , Rfl:     Past Surgical History:  Past Surgical History:   Procedure Laterality Date   •  SECTION     •  SECTION     • RESECTION SMALL BOWEL / CLOSURE ILEOSTOMY  2013    ileal duodenal resection- mass benign   • TUBAL ABDOMINAL LIGATION  2012    [facility]       Depression Screen:   PHQ-2/PHQ-9 Depression Screening 2019   Little interest or pleasure in doing things 0   Feeling down, depressed, or hopeless 0   Total Score 0       Fall Risk Screen:  GERARDADI Fall Risk Assessment has not been completed.    Review Of Systems:  Review of Systems   Constitutional: Negative for activity change and fever.   HENT: Negative for ear pain, rhinorrhea, sinus pressure and voice change.    Eyes: Negative for visual disturbance.   Respiratory: Negative for cough and shortness of breath.    Cardiovascular: Negative for chest pain.  "  Gastrointestinal: Negative for abdominal pain, diarrhea, nausea and vomiting.   Endocrine: Negative for cold intolerance and heat intolerance.   Genitourinary: Negative for frequency and urgency.   Musculoskeletal: Negative for arthralgias.   Skin: Negative for rash.   Neurological: Negative for syncope.   Hematological: Does not bruise/bleed easily.   Psychiatric/Behavioral: Negative for depressed mood. The patient is not nervous/anxious.        Physical Exam:  Vital Signs:  Visit Vitals  /64   Pulse 86   Temp 98 °F (36.7 °C)   Resp 18   Ht 161.3 cm (63.5\")   Wt 60.3 kg (133 lb)   LMP 03/21/2021 (Approximate)   SpO2 98%   BMI 23.19 kg/m²       Physical Exam  Vitals reviewed. Exam conducted with a chaperone present.   Constitutional:       General: She is not in acute distress.     Appearance: She is well-developed. She is not diaphoretic.   HENT:      Head: Normocephalic and atraumatic.      Right Ear: Hearing and external ear normal.      Left Ear: Hearing and external ear normal.      Nose: Nose normal.      Mouth/Throat:      Pharynx: No oropharyngeal exudate.   Eyes:      General: No scleral icterus.        Right eye: No discharge.         Left eye: No discharge.      Conjunctiva/sclera: Conjunctivae normal.      Pupils: Pupils are equal, round, and reactive to light.   Neck:      Thyroid: Thyromegaly present.      Trachea: No tracheal deviation.   Cardiovascular:      Rate and Rhythm: Normal rate and regular rhythm.      Heart sounds: Normal heart sounds. No murmur heard.   No friction rub. No gallop.    Pulmonary:      Effort: Pulmonary effort is normal. No respiratory distress.      Breath sounds: Normal breath sounds. No wheezing or rales.   Chest:      Breasts:         Right: No inverted nipple, mass, nipple discharge, skin change or tenderness.         Left: No inverted nipple, mass, nipple discharge, skin change or tenderness.   Abdominal:      General: Bowel sounds are normal. There is no " distension.      Palpations: Abdomen is soft. There is no mass.      Tenderness: There is no abdominal tenderness. There is no guarding or rebound.      Hernia: No hernia is present.   Genitourinary:     Labia:         Right: No rash or lesion.         Left: No rash or lesion.       Vagina: Normal.      Adnexa:         Right: No mass.          Left: No mass.        Rectum: Normal.   Musculoskeletal:         General: No tenderness or deformity. Normal range of motion.      Cervical back: Normal range of motion and neck supple.   Lymphadenopathy:      Cervical: No cervical adenopathy.   Skin:     General: Skin is warm and dry.      Capillary Refill: Capillary refill takes less than 2 seconds.      Findings: No erythema or rash.   Neurological:      Mental Status: She is alert and oriented to person, place, and time.      Cranial Nerves: No cranial nerve deficit.      Sensory: No sensory deficit.      Motor: No abnormal muscle tone.      Coordination: Coordination normal.      Deep Tendon Reflexes: Reflexes normal.   Psychiatric:         Behavior: Behavior normal.           Assessment and Plan:  Problem List Items Addressed This Visit        Cardiac and Vasculature    Dyslipidemia    Overview     check labs         Relevant Orders    Comprehensive Metabolic Panel (Completed)    Lipid Panel With / Chol / HDL Ratio (Completed)       Endocrine and Metabolic    Thyromegaly    Overview     Check u/s         Relevant Orders    US Thyroid       Genitourinary and Reproductive     Screening mammogram, encounter for    Relevant Orders    Mammo Screening Digital Tomosynthesis Bilateral With CAD       Mental Health    Anxiety    Relevant Medications    FLUoxetine (PROzac) 20 MG capsule    Moderate episode of recurrent major depressive disorder (CMS/HCC)    Overview     prozac is helping, will continue  Good social support, no suicidal or homicidal ideation. Diagnosis and treatment discussed. Discussed counseling. Discussed  medication, dosing and adverse effects.          Relevant Medications    FLUoxetine (PROzac) 20 MG capsule      Other Visit Diagnoses     Encounter for routine adult physical exam with abnormal findings    -  Primary    Relevant Orders    POC Urinalysis Dipstick, Multipro (Completed)    CBC & Differential (Completed)    TSH (Completed)          An After Visit Summary and PPPS were given to the patient.       Discussed injury prevention, diet and exercise, safe sexual practices, and screening for common diseases. Encouraged use of sunscreen and seatbelts. Discussed timing of  cervical cancer screening. Encouraged monthly self-breast exams, yearly clinical breast exams, and discussed timing of mammograms. Avoidance of tobacco encouraged. Limitation or avoidance of alcohol encouraged. Recommend yearly dental and eye exams. Also discussed monitoring of blood pressure, lipids.     I wore protective equipment throughout this patient encounter to include mask and gloves. Hand hygiene was performed before donning protective equipment and after removal when leaving the room.

## 2021-04-09 LAB
ALBUMIN SERPL-MCNC: 4 G/DL (ref 3.8–4.8)
ALBUMIN/GLOB SERPL: 1.7 {RATIO} (ref 1.2–2.2)
ALP SERPL-CCNC: 76 IU/L (ref 39–117)
ALT SERPL-CCNC: 17 IU/L (ref 0–32)
AST SERPL-CCNC: 20 IU/L (ref 0–40)
BASOPHILS # BLD AUTO: 0.1 X10E3/UL (ref 0–0.2)
BASOPHILS NFR BLD AUTO: 1 %
BILIRUB SERPL-MCNC: 0.4 MG/DL (ref 0–1.2)
BUN SERPL-MCNC: 16 MG/DL (ref 6–24)
BUN/CREAT SERPL: 24 (ref 9–23)
CALCIUM SERPL-MCNC: 9.3 MG/DL (ref 8.7–10.2)
CHLORIDE SERPL-SCNC: 106 MMOL/L (ref 96–106)
CHOLEST SERPL-MCNC: 170 MG/DL (ref 100–199)
CHOLEST/HDLC SERPL: 2.4 RATIO (ref 0–4.4)
CO2 SERPL-SCNC: 23 MMOL/L (ref 20–29)
CREAT SERPL-MCNC: 0.68 MG/DL (ref 0.57–1)
EOSINOPHIL # BLD AUTO: 0.4 X10E3/UL (ref 0–0.4)
EOSINOPHIL NFR BLD AUTO: 5 %
ERYTHROCYTE [DISTWIDTH] IN BLOOD BY AUTOMATED COUNT: 12.9 % (ref 11.7–15.4)
GLOBULIN SER CALC-MCNC: 2.4 G/DL (ref 1.5–4.5)
GLUCOSE SERPL-MCNC: 80 MG/DL (ref 65–99)
HCT VFR BLD AUTO: 32.4 % (ref 34–46.6)
HDLC SERPL-MCNC: 71 MG/DL
HGB BLD-MCNC: 10.6 G/DL (ref 11.1–15.9)
IMM GRANULOCYTES # BLD AUTO: 0 X10E3/UL (ref 0–0.1)
IMM GRANULOCYTES NFR BLD AUTO: 0 %
LDLC SERPL CALC-MCNC: 81 MG/DL (ref 0–99)
LYMPHOCYTES # BLD AUTO: 2.8 X10E3/UL (ref 0.7–3.1)
LYMPHOCYTES NFR BLD AUTO: 33 %
MCH RBC QN AUTO: 29.6 PG (ref 26.6–33)
MCHC RBC AUTO-ENTMCNC: 32.7 G/DL (ref 31.5–35.7)
MCV RBC AUTO: 91 FL (ref 79–97)
MONOCYTES # BLD AUTO: 0.7 X10E3/UL (ref 0.1–0.9)
MONOCYTES NFR BLD AUTO: 8 %
NEUTROPHILS # BLD AUTO: 4.5 X10E3/UL (ref 1.4–7)
NEUTROPHILS NFR BLD AUTO: 53 %
PLATELET # BLD AUTO: 367 X10E3/UL (ref 150–450)
POTASSIUM SERPL-SCNC: 3.6 MMOL/L (ref 3.5–5.2)
PROT SERPL-MCNC: 6.4 G/DL (ref 6–8.5)
RBC # BLD AUTO: 3.58 X10E6/UL (ref 3.77–5.28)
SODIUM SERPL-SCNC: 141 MMOL/L (ref 134–144)
TRIGL SERPL-MCNC: 100 MG/DL (ref 0–149)
TSH SERPL DL<=0.005 MIU/L-ACNC: 1.2 UIU/ML (ref 0.45–4.5)
VLDLC SERPL CALC-MCNC: 18 MG/DL (ref 5–40)
WBC # BLD AUTO: 8.4 X10E3/UL (ref 3.4–10.8)

## 2021-04-13 ENCOUNTER — TELEPHONE (OUTPATIENT)
Dept: FAMILY MEDICINE CLINIC | Facility: CLINIC | Age: 46
End: 2021-04-13

## 2021-04-13 DIAGNOSIS — D64.9 ANEMIA, UNSPECIFIED TYPE: Primary | ICD-10-CM

## 2021-04-13 NOTE — TELEPHONE ENCOUNTER
----- Message from Maryellen Azar MD sent at 4/12/2021  9:13 AM EDT -----  Her labs are ok but she is anemic. Any way I can get iron studies on her please?

## 2021-04-15 ENCOUNTER — TELEPHONE (OUTPATIENT)
Dept: FAMILY MEDICINE CLINIC | Facility: CLINIC | Age: 46
End: 2021-04-15

## 2021-04-16 ENCOUNTER — HOSPITAL ENCOUNTER (OUTPATIENT)
Dept: ULTRASOUND IMAGING | Facility: HOSPITAL | Age: 46
Discharge: HOME | End: 2021-04-16

## 2021-04-16 ENCOUNTER — HOSPITAL ENCOUNTER (OUTPATIENT)
Dept: MAMMOGRAPHY | Facility: HOSPITAL | Age: 46
Discharge: HOME | End: 2021-04-16

## 2021-04-16 DIAGNOSIS — Z12.31 SCREENING MAMMOGRAM, ENCOUNTER FOR: ICD-10-CM

## 2021-04-16 DIAGNOSIS — E01.0 THYROMEGALY: ICD-10-CM

## 2021-04-16 LAB
FERRITIN SERPL-MCNC: 7 NG/ML (ref 15–150)
IRON SATN MFR SERPL: 5 % (ref 15–55)
IRON SERPL-MCNC: 22 UG/DL (ref 27–159)
TIBC SERPL-MCNC: 434 UG/DL (ref 250–450)
UIBC SERPL-MCNC: 412 UG/DL (ref 131–425)

## 2021-04-16 PROCEDURE — 76536 US EXAM OF HEAD AND NECK: CPT

## 2021-04-16 PROCEDURE — 77063 BREAST TOMOSYNTHESIS BI: CPT

## 2021-04-16 PROCEDURE — 77067 SCR MAMMO BI INCL CAD: CPT

## 2021-04-20 ENCOUNTER — TELEPHONE (OUTPATIENT)
Dept: FAMILY MEDICINE CLINIC | Facility: CLINIC | Age: 46
End: 2021-04-20

## 2021-04-20 NOTE — TELEPHONE ENCOUNTER
Had to put patient in a double book Thursday at 230 because she can only come in on Thursday and next Thursday you are off.

## 2021-04-20 NOTE — TELEPHONE ENCOUNTER
----- Message from Maryellen Azar MD sent at 4/16/2021  5:50 PM EDT -----  I want to see her to follow up on all of her labs and u/s etc

## 2021-04-22 ENCOUNTER — OFFICE VISIT (OUTPATIENT)
Dept: FAMILY MEDICINE CLINIC | Facility: CLINIC | Age: 46
End: 2021-04-22

## 2021-04-22 VITALS
HEIGHT: 64 IN | OXYGEN SATURATION: 98 % | TEMPERATURE: 98.2 F | BODY MASS INDEX: 22.47 KG/M2 | HEART RATE: 88 BPM | SYSTOLIC BLOOD PRESSURE: 122 MMHG | RESPIRATION RATE: 18 BRPM | DIASTOLIC BLOOD PRESSURE: 84 MMHG | WEIGHT: 131.6 LBS

## 2021-04-22 DIAGNOSIS — N92.0 MENORRHAGIA WITH REGULAR CYCLE: Primary | ICD-10-CM

## 2021-04-22 DIAGNOSIS — D50.8 OTHER IRON DEFICIENCY ANEMIA: ICD-10-CM

## 2021-04-22 PROBLEM — N94.6 DYSMENORRHEA: Status: ACTIVE | Noted: 2021-04-22

## 2021-04-22 PROBLEM — D50.9 IRON DEFICIENCY ANEMIA: Status: ACTIVE | Noted: 2021-04-22

## 2021-04-22 PROCEDURE — 99213 OFFICE O/P EST LOW 20 MIN: CPT | Performed by: FAMILY MEDICINE

## 2021-04-22 NOTE — PROGRESS NOTES
Subjective   Morena Mcfadden is a 45 y.o. female.     Chief Complaint   Patient presents with   • Abnormal Lab       Pt is here to follow up her results of her labs and ultrasound    Abnormal Lab  This is a new problem. The current episode started 1 to 4 weeks ago. The problem occurs constantly. The problem has been waxing and waning. Pertinent negatives include no abdominal pain, arthralgias, chest pain, chills, congestion, coughing, fatigue, fever, nausea, rash, sore throat, vertigo, visual change or vomiting. Nothing aggravates the symptoms. She has tried nothing for the symptoms. The treatment provided no relief.        I personally reviewed and updated the patient's allergies, medications, problem list, and past medical, surgical, social, and family history. I have reviewed and confirmed the accuracy of the History of Present Illness and Review of Symptoms as documented by the MA/LPN/RN. Maryellen Azar MD    Allergies:  No Known Allergies    Social History:  Social History     Socioeconomic History   • Marital status:      Spouse name: Not on file   • Number of children: Not on file   • Years of education: Not on file   • Highest education level: Not on file   Tobacco Use   • Smoking status: Current Every Day Smoker     Packs/day: 1.00     Years: 28.00     Pack years: 28.00     Types: Cigarettes     Start date: 1993   • Smokeless tobacco: Never Used   Substance and Sexual Activity   • Alcohol use: Yes     Alcohol/week: 1.0 - 2.0 standard drinks     Types: 1 - 2 Cans of beer per week   • Drug use: No       Family History:  Family History   Problem Relation Age of Onset   • Diabetes Father    • Alcohol abuse Father    • Cancer Paternal Aunt         Unknown Type   • Heart disease Mother         heart problems       Past Medical History :  Patient Active Problem List   Diagnosis   • Anxiety   • Arthralgia of multiple sites   • Dense breast   • Dyslipidemia   • Moderate episode of recurrent major  "depressive disorder (CMS/HCC)   • Left wrist pain   • Screening mammogram, encounter for   • Screening for cervical cancer   • Thyromegaly   • Menorrhagia with regular cycle   • Iron deficiency anemia       Medication List:    Current Outpatient Medications:   •  FLUoxetine (PROzac) 20 MG capsule, Take 1 capsule by mouth Daily., Disp: 30 capsule, Rfl: 12  •  naproxen (NAPROSYN) 500 MG tablet, Take 500 mg by mouth Daily., Disp: , Rfl:     Past Surgical History:  Past Surgical History:   Procedure Laterality Date   •  SECTION     •  SECTION     • RESECTION SMALL BOWEL / CLOSURE ILEOSTOMY  2013    ileal duodenal resection- mass benign   • TUBAL ABDOMINAL LIGATION  2012    [facility]       Review of Systems:  Review of Systems   Constitutional: Negative for activity change, chills, fatigue and fever.   HENT: Negative for congestion, ear pain, rhinorrhea, sinus pressure, sore throat and voice change.    Eyes: Negative for visual disturbance.   Respiratory: Negative for cough and shortness of breath.    Cardiovascular: Negative for chest pain.   Gastrointestinal: Negative for abdominal pain, diarrhea, nausea and vomiting.   Endocrine: Negative for cold intolerance and heat intolerance.   Genitourinary: Negative for frequency and urgency.   Musculoskeletal: Negative for arthralgias.   Skin: Negative for rash.   Neurological: Negative for vertigo and syncope.   Hematological: Does not bruise/bleed easily.   Psychiatric/Behavioral: Negative for depressed mood. The patient is not nervous/anxious.        Physical Exam:  Vital Signs:  Vital Signs:   /84   Pulse 88   Temp 98.2 °F (36.8 °C)   Resp 18   Ht 161.3 cm (63.5\")   Wt 59.7 kg (131 lb 9.6 oz)   SpO2 98%   BMI 22.95 kg/m²     Result Review :                Physical Exam  Vitals reviewed.   Constitutional:       Appearance: She is well-developed.   Eyes:      General:         Right eye: No discharge.         Left eye: No " discharge.   Cardiovascular:      Rate and Rhythm: Normal rate and regular rhythm.      Heart sounds: Normal heart sounds. No murmur heard.   No friction rub. No gallop.    Pulmonary:      Effort: Pulmonary effort is normal. No respiratory distress.      Breath sounds: Normal breath sounds. No wheezing or rales.   Skin:     General: Skin is warm and dry.      Findings: No rash.   Neurological:      Mental Status: She is alert and oriented to person, place, and time.         Assessment and Plan:  Problems Addressed this Visit        Genitourinary and Reproductive     Menorrhagia with regular cycle - Primary       Hematology and Neoplasia    Iron deficiency anemia     From heavy menses    Labs reviewed with her. Heme cards given for her to bring back. Most likely it is heavy menses. Will have her see a gyn. No OCP given since she is a smoker           Diagnoses       Codes Comments    Menorrhagia with regular cycle    -  Primary ICD-10-CM: N92.0  ICD-9-CM: 626.2     Other iron deficiency anemia     ICD-10-CM: D50.8  ICD-9-CM: 280.8            An After Visit Summary and PPPS were given to the patient.         I wore protective equipment throughout this patient encounter to include mask and gloves. Hand hygiene was performed before donning protective equipment and after removal when leaving the room.

## 2021-04-26 ENCOUNTER — TELEPHONE (OUTPATIENT)
Dept: FAMILY MEDICINE CLINIC | Facility: CLINIC | Age: 46
End: 2021-04-26

## 2021-04-26 NOTE — ASSESSMENT & PLAN NOTE
From heavy menses    Labs reviewed with her. Heme cards given for her to bring back. Most likely it is heavy menses. Will have her see a gyn. No OCP given since she is a smoker

## 2021-06-23 ENCOUNTER — TELEPHONE (OUTPATIENT)
Dept: FAMILY MEDICINE CLINIC | Facility: CLINIC | Age: 46
End: 2021-06-23

## 2022-07-02 DIAGNOSIS — F41.9 ANXIETY: ICD-10-CM

## 2022-07-07 RX ORDER — FLUOXETINE HYDROCHLORIDE 20 MG/1
CAPSULE ORAL
Qty: 30 CAPSULE | Refills: 0 | Status: SHIPPED | OUTPATIENT
Start: 2022-07-07 | End: 2022-12-01

## 2022-12-01 ENCOUNTER — HOSPITAL ENCOUNTER (OUTPATIENT)
Dept: GENERAL RADIOLOGY | Facility: HOSPITAL | Age: 47
Discharge: HOME | End: 2022-12-01

## 2022-12-01 ENCOUNTER — OFFICE VISIT (OUTPATIENT)
Dept: FAMILY MEDICINE CLINIC | Facility: CLINIC | Age: 47
End: 2022-12-01

## 2022-12-01 VITALS
HEART RATE: 117 BPM | HEIGHT: 64 IN | OXYGEN SATURATION: 99 % | RESPIRATION RATE: 18 BRPM | SYSTOLIC BLOOD PRESSURE: 112 MMHG | TEMPERATURE: 97.7 F | BODY MASS INDEX: 24.79 KG/M2 | DIASTOLIC BLOOD PRESSURE: 82 MMHG | WEIGHT: 145.2 LBS

## 2022-12-01 DIAGNOSIS — M79.605 BILATERAL LEG PAIN: Primary | ICD-10-CM

## 2022-12-01 DIAGNOSIS — M79.605 BILATERAL LEG PAIN: ICD-10-CM

## 2022-12-01 DIAGNOSIS — M25.552 BILATERAL HIP PAIN: ICD-10-CM

## 2022-12-01 DIAGNOSIS — M79.604 BILATERAL LEG PAIN: ICD-10-CM

## 2022-12-01 DIAGNOSIS — M79.604 BILATERAL LEG PAIN: Primary | ICD-10-CM

## 2022-12-01 DIAGNOSIS — N92.6 IRREGULAR MENSES: ICD-10-CM

## 2022-12-01 DIAGNOSIS — M25.551 BILATERAL HIP PAIN: ICD-10-CM

## 2022-12-01 PROBLEM — N92.0 MENORRHAGIA WITH REGULAR CYCLE: Status: RESOLVED | Noted: 2021-04-22 | Resolved: 2022-12-01

## 2022-12-01 LAB
B-HCG UR QL: NEGATIVE
EXPIRATION DATE: NORMAL
INTERNAL NEGATIVE CONTROL: NEGATIVE
INTERNAL POSITIVE CONTROL: NORMAL
Lab: NORMAL

## 2022-12-01 PROCEDURE — 73521 X-RAY EXAM HIPS BI 2 VIEWS: CPT

## 2022-12-01 PROCEDURE — 81025 URINE PREGNANCY TEST: CPT | Performed by: FAMILY MEDICINE

## 2022-12-01 PROCEDURE — 99214 OFFICE O/P EST MOD 30 MIN: CPT | Performed by: FAMILY MEDICINE

## 2022-12-01 PROCEDURE — 72100 X-RAY EXAM L-S SPINE 2/3 VWS: CPT

## 2022-12-01 RX ORDER — PREDNISONE 1 MG/1
5 TABLET ORAL DAILY
Qty: 45 TABLET | Refills: 0 | Status: SHIPPED | OUTPATIENT
Start: 2022-12-01

## 2022-12-03 LAB
FSH SERPL-ACNC: 9.6 MIU/ML
LH SERPL-ACNC: 13.5 MIU/ML

## 2022-12-09 NOTE — ASSESSMENT & PLAN NOTE
Will check back xray and also hip xray    Ice three times a day for about 10-15 minutes for the first 1-2 days. Then may alternate heat and ice. Better body mechanics discussed. Home exercises discussed and hand out given. Discussed nsaids and if they can be taken. May need imaging and or PT if persists.  Discussed red flags, if there is severe pain, fever with pain, loss of movement in one or both legs pain, numbness in groin or both legs, trouble urinating or defecating on oneself, then patient is to go to the ER.

## 2022-12-09 NOTE — ASSESSMENT & PLAN NOTE
Start steroids    Discussed risks of steroids: hyperglycemia, osteoporosis, avascular necrosis, anxiety, insomnia and cataracts. Patient states understanding